# Patient Record
Sex: FEMALE | URBAN - METROPOLITAN AREA
[De-identification: names, ages, dates, MRNs, and addresses within clinical notes are randomized per-mention and may not be internally consistent; named-entity substitution may affect disease eponyms.]

---

## 2021-02-04 ENCOUNTER — TELEPHONE (OUTPATIENT)
Dept: ENDOCRINOLOGY | Age: 31
End: 2021-02-04

## 2021-02-16 ENCOUNTER — TELEPHONE (OUTPATIENT)
Dept: ENDOCRINOLOGY | Age: 31
End: 2021-02-16

## 2021-02-16 NOTE — TELEPHONE ENCOUNTER
Called pt because we rec'd referral from Dr Myles Mack and pt was driving and her service was spotty. She will call us back tomor to schedule       [address] 1940, [address] [phone] [facility])  [phone] (Mobile)  [email or web address] English (Preferred) White / Not  or  Unknown   Reason for Referral    Consultation (Routine)  Consultation (Routine)   Status Reason Specialty Diagnoses / Procedures Referred By Contact Referred To Contact   Authorized Eval and Treat   Internal Medicine-Endocrinology Diagnoses    Labile blood glucose    Prediabetes     Zabrina Plasencia, DO    [facility]   Phone: [phone]    Fax: [phone]   Leon Paniagua MD    [address] [address], [address]-
[facility]